# Patient Record
Sex: FEMALE | Race: ASIAN | NOT HISPANIC OR LATINO
[De-identification: names, ages, dates, MRNs, and addresses within clinical notes are randomized per-mention and may not be internally consistent; named-entity substitution may affect disease eponyms.]

---

## 2024-03-01 ENCOUNTER — APPOINTMENT (OUTPATIENT)
Dept: CARDIOLOGY | Facility: CLINIC | Age: 39
End: 2024-03-01
Payer: MEDICAID

## 2024-03-01 ENCOUNTER — NON-APPOINTMENT (OUTPATIENT)
Age: 39
End: 2024-03-01

## 2024-03-01 VITALS
TEMPERATURE: 97.4 F | WEIGHT: 84 LBS | HEART RATE: 118 BPM | DIASTOLIC BLOOD PRESSURE: 76 MMHG | RESPIRATION RATE: 18 BRPM | BODY MASS INDEX: 16.93 KG/M2 | HEIGHT: 59.06 IN | SYSTOLIC BLOOD PRESSURE: 108 MMHG | OXYGEN SATURATION: 97 %

## 2024-03-01 DIAGNOSIS — R07.89 OTHER CHEST PAIN: ICD-10-CM

## 2024-03-01 PROBLEM — Z00.00 ENCOUNTER FOR PREVENTIVE HEALTH EXAMINATION: Status: ACTIVE | Noted: 2024-03-01

## 2024-03-01 PROCEDURE — ZZZZZ: CPT

## 2024-03-01 PROCEDURE — 93015 CV STRESS TEST SUPVJ I&R: CPT

## 2024-03-01 PROCEDURE — 93306 TTE W/DOPPLER COMPLETE: CPT

## 2024-03-01 PROCEDURE — 99204 OFFICE O/P NEW MOD 45 MIN: CPT | Mod: 25

## 2024-03-01 PROCEDURE — 93000 ELECTROCARDIOGRAM COMPLETE: CPT | Mod: 59

## 2024-03-01 NOTE — HISTORY OF PRESENT ILLNESS
[FreeTextEntry1] : 38 year old F with history of hyperthyroidism (well controlled, off meds) who presents for palpitations.  Pt states at baseline, her HR ranges from 90s-110s but recently for the past 3 days, she noticed tachycardia to 120s-130s without much change in symptoms. She does have occasional palpitations and transient episodes of chest discomfort. No SOB, recent travel, f/c, orthopnea, PND, LE edema, dizziness, or LOC. She reports history of hyperthyroidism for which she took medications briefly 2 years ago but haven't taken any since then. She does report chronic tremors and feelings of anxiety.

## 2024-03-01 NOTE — PHYSICAL EXAM
[Well Developed] : well developed [Well Nourished] : well nourished [No Acute Distress] : no acute distress [Normal Conjunctiva] : normal conjunctiva [Normal Venous Pressure] : normal venous pressure [No Carotid Bruit] : no carotid bruit [Normal S1, S2] : normal S1, S2 [No Rub] : no rub [No Murmur] : no murmur [No Gallop] : no gallop [Clear Lung Fields] : clear lung fields [Good Air Entry] : good air entry [No Respiratory Distress] : no respiratory distress  [Soft] : abdomen soft [Non Tender] : non-tender [No Masses/organomegaly] : no masses/organomegaly [Normal Bowel Sounds] : normal bowel sounds [Normal Gait] : normal gait [No Cyanosis] : no cyanosis [No Edema] : no edema [No Varicosities] : no varicosities [No Clubbing] : no clubbing [No Rash] : no rash [No Skin Lesions] : no skin lesions [No Focal Deficits] : no focal deficits [Moves all extremities] : moves all extremities [Normal Speech] : normal speech [Alert and Oriented] : alert and oriented [Normal memory] : normal memory

## 2024-03-01 NOTE — ASSESSMENT
[FreeTextEntry1] : 38 year old F with history of hyperthyroidism (well controlled, off meds) who presents for palpitations.  Pt states at baseline, her HR ranges from 90s-110s but recently for the past 3 days, she noticed tachycardia to 120s-130s without much change in symptoms. She does have occasional palpitations and transient episodes of chest discomfort. No SOB, recent travel, f/c, orthopnea, PND, LE edema, dizziness, or LOC. She reports history of hyperthyroidism for which she took medications briefly 2 years ago but haven't taken any since then. She does report chronic tremors and feelings of anxiety.  1) Chest discomfort 2) Palpitations - Symptoms may be related to recurrent hyperthyroidism. She is not hypoxic and does not have SOB. - EKG today showed sinus tachycardia with non-specific T wave abnormality - I advised pt to undergo treadmill stress; pt did 3:55 min Arnoldo protocol, achieved 6.7 METs, felt tired/SOB but had no ischemic ECG changes and O2 Sat remained normal at 98% - I advised pt to undergo TTE 3/1/24 which showed normal LV and RV function with trace MR/TR and no AI - Check 1 week event monitor - Pt was reassured; agree with lab tests to work-up her sinus tachycardia, especially to r/o recurrent hyperthyroidism. Pt states she is going for bloodwork later today as ordered by PCP.  3) Follow-up, pending monitor

## 2024-03-27 DIAGNOSIS — R00.2 PALPITATIONS: ICD-10-CM

## 2024-03-27 RX ORDER — PROPRANOLOL HYDROCHLORIDE 10 MG/1
10 TABLET ORAL
Qty: 30 | Refills: 1 | Status: ACTIVE | COMMUNITY
Start: 2024-03-27 | End: 1900-01-01